# Patient Record
Sex: FEMALE | HISPANIC OR LATINO | URBAN - METROPOLITAN AREA
[De-identification: names, ages, dates, MRNs, and addresses within clinical notes are randomized per-mention and may not be internally consistent; named-entity substitution may affect disease eponyms.]

---

## 2021-10-11 ENCOUNTER — OFFICE VISIT (OUTPATIENT)
Dept: URBAN - METROPOLITAN AREA CLINIC 121 | Facility: CLINIC | Age: 61
End: 2021-10-11

## 2022-05-17 ENCOUNTER — OFFICE VISIT (OUTPATIENT)
Dept: URBAN - METROPOLITAN AREA CLINIC 63 | Facility: CLINIC | Age: 62
End: 2022-05-17

## 2022-05-18 ENCOUNTER — OFFICE VISIT (OUTPATIENT)
Dept: URBAN - METROPOLITAN AREA CLINIC 63 | Facility: CLINIC | Age: 62
End: 2022-05-18

## 2022-06-21 ENCOUNTER — OFFICE VISIT (OUTPATIENT)
Dept: URBAN - METROPOLITAN AREA CLINIC 63 | Facility: CLINIC | Age: 62
End: 2022-06-21

## 2022-07-09 ENCOUNTER — TELEPHONE ENCOUNTER (OUTPATIENT)
Dept: URBAN - METROPOLITAN AREA CLINIC 121 | Facility: CLINIC | Age: 62
End: 2022-07-09

## 2022-07-09 RX ORDER — DICLOFENAC SODIUM TOPICAL GEL, 1%, 10 MG/G
GEL TOPICAL
Refills: 0 | OUTPATIENT
Start: 2022-05-16 | End: 2022-06-21

## 2022-07-09 RX ORDER — OLOPATADINE HYDROCHLORIDE 1 MG/ML
SOLUTION/ DROPS OPHTHALMIC
Refills: 0 | OUTPATIENT
Start: 2022-05-16 | End: 2022-06-21

## 2022-07-09 RX ORDER — ALBUTEROL SULFATE 90 UG/1
AEROSOL, METERED RESPIRATORY (INHALATION)
Refills: 0 | OUTPATIENT
Start: 2022-05-16 | End: 2022-06-21

## 2022-07-09 RX ORDER — ATORVASTATIN CALCIUM 20 MG/1
TABLET, FILM COATED ORAL
Refills: 0 | OUTPATIENT
Start: 2022-05-16 | End: 2022-06-21

## 2022-07-09 RX ORDER — METFORMIN HYDROCHLORIDE 850 MG/1
TABLET ORAL
Refills: 0 | OUTPATIENT
Start: 2022-05-16 | End: 2022-06-21

## 2022-07-09 RX ORDER — LISINOPRIL AND HYDROCHLOROTHIAZIDE TABLETS 10; 12.5 MG/1; MG/1
TABLET ORAL
Refills: 0 | OUTPATIENT
Start: 2022-05-16 | End: 2022-06-21

## 2022-07-10 ENCOUNTER — TELEPHONE ENCOUNTER (OUTPATIENT)
Dept: URBAN - METROPOLITAN AREA CLINIC 121 | Facility: CLINIC | Age: 62
End: 2022-07-10

## 2022-07-10 RX ORDER — FAMOTIDINE 20 MG/1
1 EVERY BEDTIME TABLET ORAL
Refills: 2 | Status: ACTIVE | COMMUNITY
Start: 2022-05-18

## 2022-07-10 RX ORDER — METFORMIN HYDROCHLORIDE 850 MG/1
TABLET ORAL
Refills: 0 | Status: ACTIVE | COMMUNITY
Start: 2022-06-21

## 2022-07-10 RX ORDER — ALBUTEROL SULFATE 90 UG/1
AEROSOL, METERED RESPIRATORY (INHALATION)
Refills: 0 | Status: ACTIVE | COMMUNITY
Start: 2022-06-21

## 2022-07-10 RX ORDER — LISINOPRIL AND HYDROCHLOROTHIAZIDE TABLETS 10; 12.5 MG/1; MG/1
TABLET ORAL
Refills: 0 | Status: ACTIVE | COMMUNITY
Start: 2022-06-21

## 2022-07-10 RX ORDER — LINACLOTIDE 72 UG/1
ONCE A DAY TAKE ONE CAPSULE PO ONCE DAILY 30 MINS BEFORE BREAKFAST CAPSULE, GELATIN COATED ORAL ONCE A DAY
Refills: 0 | Status: ACTIVE | COMMUNITY
Start: 2022-06-21

## 2022-07-10 RX ORDER — OLOPATADINE HYDROCHLORIDE 1 MG/ML
SOLUTION/ DROPS OPHTHALMIC
Refills: 0 | Status: ACTIVE | COMMUNITY
Start: 2022-06-21

## 2022-07-10 RX ORDER — ATORVASTATIN CALCIUM 20 MG/1
TABLET, FILM COATED ORAL
Refills: 0 | Status: ACTIVE | COMMUNITY
Start: 2022-06-21

## 2022-07-10 RX ORDER — FAMOTIDINE 20 MG/1
1 EVERY BEDTIME TABLET ORAL
Refills: 2 | Status: ACTIVE | COMMUNITY
Start: 2022-06-21

## 2022-07-10 RX ORDER — DICLOFENAC SODIUM TOPICAL GEL, 1%, 10 MG/G
GEL TOPICAL
Refills: 0 | Status: ACTIVE | COMMUNITY
Start: 2022-06-21

## 2022-07-22 ENCOUNTER — TELEPHONE ENCOUNTER (OUTPATIENT)
Dept: URBAN - METROPOLITAN AREA CLINIC 63 | Facility: CLINIC | Age: 62
End: 2022-07-22

## 2022-09-20 ENCOUNTER — OFFICE VISIT (OUTPATIENT)
Dept: URBAN - METROPOLITAN AREA CLINIC 63 | Facility: CLINIC | Age: 62
End: 2022-09-20

## 2022-09-20 RX ORDER — METFORMIN HYDROCHLORIDE 850 MG/1
TABLET ORAL
Refills: 0 | Status: ACTIVE | COMMUNITY
Start: 2022-06-21

## 2022-09-20 RX ORDER — LINACLOTIDE 72 UG/1
ONCE A DAY TAKE ONE CAPSULE PO ONCE DAILY 30 MINS BEFORE BREAKFAST CAPSULE, GELATIN COATED ORAL ONCE A DAY
Refills: 0 | Status: ACTIVE | COMMUNITY
Start: 2022-06-21

## 2022-09-20 RX ORDER — OLOPATADINE HYDROCHLORIDE 1 MG/ML
SOLUTION/ DROPS OPHTHALMIC
Refills: 0 | Status: ACTIVE | COMMUNITY
Start: 2022-06-21

## 2022-09-20 RX ORDER — LISINOPRIL AND HYDROCHLOROTHIAZIDE TABLETS 10; 12.5 MG/1; MG/1
TABLET ORAL
Refills: 0 | Status: ACTIVE | COMMUNITY
Start: 2022-06-21

## 2022-09-20 RX ORDER — ALBUTEROL SULFATE 90 UG/1
AEROSOL, METERED RESPIRATORY (INHALATION)
Refills: 0 | Status: ACTIVE | COMMUNITY
Start: 2022-06-21

## 2022-09-20 RX ORDER — DICLOFENAC SODIUM TOPICAL GEL, 1%, 10 MG/G
GEL TOPICAL
Refills: 0 | Status: ACTIVE | COMMUNITY
Start: 2022-06-21

## 2022-09-20 RX ORDER — FAMOTIDINE 20 MG/1
1 EVERY BEDTIME TABLET ORAL
Refills: 2 | Status: ACTIVE | COMMUNITY
Start: 2022-06-21

## 2022-09-20 RX ORDER — ATORVASTATIN CALCIUM 20 MG/1
TABLET, FILM COATED ORAL
Refills: 0 | Status: ACTIVE | COMMUNITY
Start: 2022-06-21

## 2022-09-20 NOTE — HPI-TODAY'S VISIT:
Liane is a pleasant 61-year-old female who presents today for follow up. Patient notes severe reaction to anesthesia. She states she was "paralyzed" and had anaphylaxis during dental work in the past. She states the only anesthesia she can use is mepivacaine 3% and wears a medical bracelet    Labs dated 2/28/2022 showed a normal hemoglobin.  MCV 78.  Normal platelets.  Normal CMP.    EGD July 2017 revealed hiatal hernia in the cardia.  Congestion and erythema in the antrum compatible with gastritis.  Normal mucosa in the duodenum.  Pathology revealed gastric mucosa with reactive gastritis/reactive gastropathy and erosion.  Negative for H. pylori or intestinal metaplasia.  No evidence of dysplasia.  Small bowel biopsies revealed duodenal mucosa with preserved villous architecture.  Negative for inflammation or sprue.  No increase in intraepithelial T lymphocytes    Colonoscopy in October 2018 demonstrated a 3 mm polyp removed from the hepatic flexure.  Diverticulosis throughout the entire colon.  Internal hemorrhoids.  Consider hemorrhoidal banding.  Pathology consistent with a tubular adenoma negative for high-grade dysplasia    Notes on average a daily bowel movement. Noting efficacy with high fiber diet. However if she forgets to consume high quantity of fiber she can go 2-3 days without a BM. This is chronic for her. Patient inquiring about medication to keep her regular. Notes reflux at night and did not  famotidine sent last visit. Labs ordered last visit not done. Denies any nausea, vomiting, dysphagia, odynophagia, hematemesis, coffee ground emesis, abdominal pain, change in bowel habits, abnormal weight loss, BRBPR or melena. Denies any family history of colon cancer or colon polyps. Notes no other GI complaints at this time

## 2022-10-13 ENCOUNTER — TELEPHONE ENCOUNTER (OUTPATIENT)
Dept: URBAN - METROPOLITAN AREA CLINIC 63 | Facility: CLINIC | Age: 62
End: 2022-10-13

## 2022-11-14 ENCOUNTER — LAB OUTSIDE AN ENCOUNTER (OUTPATIENT)
Dept: URBAN - METROPOLITAN AREA CLINIC 63 | Facility: CLINIC | Age: 62
End: 2022-11-14

## 2022-11-15 LAB
ABSOLUTE BASOPHILS: 38
ABSOLUTE EOSINOPHILS: 108
ABSOLUTE LYMPHOCYTES: 2041
ABSOLUTE MONOCYTES: 356
ABSOLUTE NEUTROPHILS: 2857
BASOPHILS: 0.7
EOSINOPHILS: 2
FERRITIN, SERUM: 202
FOLATE (FOLIC ACID), SERUM: 10.3
HEMATOCRIT: 40.4
HEMOGLOBIN: 12.8
IRON BIND.CAP.(TIBC): 340
IRON SATURATION: 22
IRON: 74
LYMPHOCYTES: 37.8
MCH: 25
MCHC: 31.7
MCV: 79.1
MONOCYTES: 6.6
MPV: 10.8
NEUTROPHILS: 52.9
PLATELET COUNT: 265
RDW: 13.6
RED BLOOD CELL COUNT: 5.11
VITAMIN B12: >2000
WHITE BLOOD CELL COUNT: 5.4

## 2022-11-22 ENCOUNTER — OFFICE VISIT (OUTPATIENT)
Dept: URBAN - METROPOLITAN AREA CLINIC 63 | Facility: CLINIC | Age: 62
End: 2022-11-22

## 2022-12-06 ENCOUNTER — WEB ENCOUNTER (OUTPATIENT)
Dept: URBAN - METROPOLITAN AREA CLINIC 63 | Facility: CLINIC | Age: 62
End: 2022-12-06

## 2022-12-06 ENCOUNTER — OFFICE VISIT (OUTPATIENT)
Dept: URBAN - METROPOLITAN AREA CLINIC 63 | Facility: CLINIC | Age: 62
End: 2022-12-06
Payer: COMMERCIAL

## 2022-12-06 VITALS
HEART RATE: 81 BPM | OXYGEN SATURATION: 95 % | WEIGHT: 157.4 LBS | HEIGHT: 62 IN | SYSTOLIC BLOOD PRESSURE: 120 MMHG | BODY MASS INDEX: 28.97 KG/M2 | DIASTOLIC BLOOD PRESSURE: 80 MMHG | TEMPERATURE: 97.2 F

## 2022-12-06 DIAGNOSIS — K59.00 CONSTIPATION, UNSPECIFIED: ICD-10-CM

## 2022-12-06 DIAGNOSIS — K21.9 GERD WITHOUT ESOPHAGITIS: ICD-10-CM

## 2022-12-06 PROCEDURE — 99213 OFFICE O/P EST LOW 20 MIN: CPT | Performed by: PHYSICIAN ASSISTANT

## 2022-12-06 RX ORDER — LINACLOTIDE 72 UG/1
1 CAPSULE AT LEAST 30 MINUTES BEFORE THE FIRST MEAL OF THE DAY ON AN EMPTY STOMACH CAPSULE, GELATIN COATED ORAL ONCE A DAY
Qty: 90 CAPSULE | Refills: 3 | OUTPATIENT

## 2022-12-06 RX ORDER — ATORVASTATIN CALCIUM 20 MG/1
TABLET, FILM COATED ORAL
Refills: 0 | Status: ACTIVE | COMMUNITY
Start: 2022-06-21

## 2022-12-06 RX ORDER — LINACLOTIDE 72 UG/1
ONCE A DAY TAKE ONE CAPSULE PO ONCE DAILY 30 MINS BEFORE BREAKFAST CAPSULE, GELATIN COATED ORAL ONCE A DAY
Refills: 0 | Status: ACTIVE | COMMUNITY
Start: 2022-06-21

## 2022-12-06 RX ORDER — METFORMIN HYDROCHLORIDE 850 MG/1
TABLET ORAL
Refills: 0 | Status: ACTIVE | COMMUNITY
Start: 2022-06-21

## 2022-12-06 RX ORDER — LISINOPRIL AND HYDROCHLOROTHIAZIDE TABLETS 10; 12.5 MG/1; MG/1
TABLET ORAL
Refills: 0 | Status: ACTIVE | COMMUNITY
Start: 2022-06-21

## 2022-12-06 RX ORDER — DICLOFENAC SODIUM TOPICAL GEL, 1%, 10 MG/G
GEL TOPICAL
Refills: 0 | Status: ACTIVE | COMMUNITY
Start: 2022-06-21

## 2022-12-06 RX ORDER — FAMOTIDINE 20 MG/1
1 TABLET AT BEDTIME AS NEEDED TABLET, FILM COATED ORAL ONCE A DAY
Qty: 90 TABLET | Refills: 3 | OUTPATIENT

## 2022-12-06 NOTE — HPI-TODAY'S VISIT:
Liane is a pleasant 62-year-old female who presents today for follow up. Patient notes severe reaction to anesthesia. She states she was "paralyzed" and had anaphylaxis during dental work in the past. She states the only anesthesia she can use is mepivacaine 3% and wears a medical bracelet   [facility] genetic testing dated 6/21/2022 showed no clinically significant mutation identified.  Breast cancer risk score remaining lifetime risk 2.7%.  No variants of uncertain significance identified.  Labs dated 11/14/2022 showed a normal hemoglobin.  Platelets normal.  RBC 5.11, MCV 79.1.  Ferritin normal.  Iron studies normal.  Vitamin B12/folate unremarkable. Labs dated 2/28/2022 showed a normal CMP.    EGD July 2017 revealed hiatal hernia in the cardia.  Congestion and erythema in the antrum compatible with gastritis.  Normal mucosa in the duodenum.  Pathology revealed gastric mucosa with reactive gastritis/reactive gastropathy and erosion.  Negative for H. pylori or intestinal metaplasia.  No evidence of dysplasia.  Small bowel biopsies revealed duodenal mucosa with preserved villous architecture.  Negative for inflammation or sprue.  No increase in intraepithelial T lymphocytes    Colonoscopy in October 2018 demonstrated a 3 mm polyp removed from the hepatic flexure.  Diverticulosis throughout the entire colon.  Internal hemorrhoids.  Consider hemorrhoidal banding.  Pathology consistent with a tubular adenoma negative for high-grade dysplasia    Notes on average a daily bowel movement. Noting efficacy with high fiber diet and Linzess 72 mcg. Patient given a trial of Linzess 72 mcg once daily last visit but failed to report efficacy and thus has run out of medication. Chronic reflux well controlled on famotidine 20 mg qhs. Denies any nausea, vomiting, dysphagia, odynophagia, hematemesis, coffee ground emesis, abdominal pain, change in bowel habits, abnormal weight loss, BRBPR or melena. Denies any family history of colon cancer or colon polyps. Notes no other GI complaints at this time

## 2022-12-12 ENCOUNTER — TELEPHONE ENCOUNTER (OUTPATIENT)
Dept: URBAN - METROPOLITAN AREA CLINIC 63 | Facility: CLINIC | Age: 62
End: 2022-12-12

## 2023-01-11 ENCOUNTER — TELEPHONE ENCOUNTER (OUTPATIENT)
Dept: URBAN - METROPOLITAN AREA CLINIC 63 | Facility: CLINIC | Age: 63
End: 2023-01-11

## 2023-02-10 ENCOUNTER — P2P PATIENT RECORD (OUTPATIENT)
Age: 63
End: 2023-02-10

## 2023-06-14 ENCOUNTER — OFFICE VISIT (OUTPATIENT)
Dept: URBAN - METROPOLITAN AREA CLINIC 63 | Facility: CLINIC | Age: 63
End: 2023-06-14

## 2023-06-14 RX ORDER — METFORMIN HYDROCHLORIDE 850 MG/1
TABLET ORAL
Refills: 0 | COMMUNITY
Start: 2022-06-21

## 2023-06-14 RX ORDER — ATORVASTATIN CALCIUM 20 MG/1
TABLET, FILM COATED ORAL
Refills: 0 | COMMUNITY
Start: 2022-06-21

## 2023-06-14 RX ORDER — DICLOFENAC SODIUM TOPICAL GEL, 1%, 10 MG/G
GEL TOPICAL
Refills: 0 | COMMUNITY
Start: 2022-06-21

## 2023-06-14 RX ORDER — FAMOTIDINE 20 MG/1
1 TABLET AT BEDTIME AS NEEDED TABLET, FILM COATED ORAL ONCE A DAY
Qty: 90 TABLET | Refills: 3 | COMMUNITY

## 2023-06-14 RX ORDER — LINACLOTIDE 72 UG/1
1 CAPSULE AT LEAST 30 MINUTES BEFORE THE FIRST MEAL OF THE DAY ON AN EMPTY STOMACH CAPSULE, GELATIN COATED ORAL ONCE A DAY
Qty: 90 CAPSULE | Refills: 3 | COMMUNITY

## 2023-06-14 RX ORDER — LISINOPRIL AND HYDROCHLOROTHIAZIDE TABLETS 10; 12.5 MG/1; MG/1
TABLET ORAL
Refills: 0 | COMMUNITY
Start: 2022-06-21

## 2023-07-05 ENCOUNTER — WEB ENCOUNTER (OUTPATIENT)
Dept: URBAN - METROPOLITAN AREA CLINIC 63 | Facility: CLINIC | Age: 63
End: 2023-07-05

## 2023-07-05 ENCOUNTER — LAB OUTSIDE AN ENCOUNTER (OUTPATIENT)
Dept: URBAN - METROPOLITAN AREA CLINIC 63 | Facility: CLINIC | Age: 63
End: 2023-07-05

## 2023-07-05 ENCOUNTER — OFFICE VISIT (OUTPATIENT)
Dept: URBAN - METROPOLITAN AREA CLINIC 63 | Facility: CLINIC | Age: 63
End: 2023-07-05
Payer: COMMERCIAL

## 2023-07-05 VITALS
DIASTOLIC BLOOD PRESSURE: 82 MMHG | OXYGEN SATURATION: 98 % | WEIGHT: 154.8 LBS | HEART RATE: 91 BPM | BODY MASS INDEX: 28.49 KG/M2 | TEMPERATURE: 96.9 F | SYSTOLIC BLOOD PRESSURE: 122 MMHG | HEIGHT: 62 IN

## 2023-07-05 DIAGNOSIS — K59.09 CHRONIC CONSTIPATION: ICD-10-CM

## 2023-07-05 DIAGNOSIS — Z12.11 ENCOUNTER FOR SCREENING FOR MALIGNANT NEOPLASM OF COLON: ICD-10-CM

## 2023-07-05 DIAGNOSIS — K64.8 OTHER HEMORRHOIDS: ICD-10-CM

## 2023-07-05 DIAGNOSIS — Z86.010 PERSONAL HISTORY OF COLONIC POLYPS: ICD-10-CM

## 2023-07-05 DIAGNOSIS — K21.9 GERD WITHOUT ESOPHAGITIS: ICD-10-CM

## 2023-07-05 PROBLEM — 428283002: Status: ACTIVE | Noted: 2023-07-05

## 2023-07-05 PROCEDURE — 99214 OFFICE O/P EST MOD 30 MIN: CPT | Performed by: PHYSICIAN ASSISTANT

## 2023-07-05 RX ORDER — LIDOCAINE 50 MG/G
1 APPLICATION AS NEEDED CREAM TOPICAL
Qty: 1 TUBE | Refills: 1 | OUTPATIENT
Start: 2023-07-05 | End: 2023-09-03

## 2023-07-05 RX ORDER — METFORMIN HYDROCHLORIDE 850 MG/1
TABLET ORAL
Refills: 0 | Status: ACTIVE | COMMUNITY
Start: 2022-06-21

## 2023-07-05 RX ORDER — POLYETHYLENE GLYCOL 3350, SODIUM CHLORIDE, SODIUM BICARBONATE, POTASSIUM CHLORIDE 420; 11.2; 5.72; 1.48 G/4L; G/4L; G/4L; G/4L
AS DIRECTED POWDER, FOR SOLUTION ORAL ONCE
Qty: 4000 | Refills: 0 | OUTPATIENT
Start: 2023-07-05 | End: 2023-07-06

## 2023-07-05 RX ORDER — LISINOPRIL AND HYDROCHLOROTHIAZIDE TABLETS 10; 12.5 MG/1; MG/1
TABLET ORAL
Refills: 0 | Status: ACTIVE | COMMUNITY
Start: 2022-06-21

## 2023-07-05 RX ORDER — PLECANATIDE 3 MG/1
1 TABLET TABLET ORAL ONCE A DAY
Qty: 30 | Refills: 1 | OUTPATIENT
Start: 2023-07-05 | End: 2023-09-03

## 2023-07-05 RX ORDER — DICLOFENAC SODIUM TOPICAL GEL, 1%, 10 MG/G
GEL TOPICAL
Refills: 0 | Status: ACTIVE | COMMUNITY
Start: 2022-06-21

## 2023-07-05 RX ORDER — ONDANSETRON HYDROCHLORIDE 4 MG/1
1 TABLET TABLET, FILM COATED ORAL
Qty: 2 | Refills: 0 | OUTPATIENT
Start: 2023-07-05

## 2023-07-05 RX ORDER — FAMOTIDINE 20 MG/1
1 TABLET AT BEDTIME AS NEEDED TABLET, FILM COATED ORAL ONCE A DAY
Qty: 90 TABLET | Refills: 3 | Status: ACTIVE | COMMUNITY

## 2023-07-05 NOTE — HPI-TODAY'S VISIT:
Liane is a pleasant 62-year-old female who presents today for a follow up. Patient notes severe reaction to anesthesia. She states she was "paralyzed" and had anaphylaxis during dental work in the past. She states the only anesthesia she can use is mepivacaine 3% and wears a medical bracelet   Labs dated 2/27/2023 showed a normal hemoglobin.  Normal platelets.  Normal renal function.  Normal LFTs.  [employer] genetic testing dated 6/21/2022 showed no clinically significant mutation identified.  Breast cancer risk score remaining lifetime risk 2.7%.  No variants of uncertain significance identified.   EGD July 2017 revealed hiatal hernia in the cardia.  Congestion and erythema in the antrum compatible with gastritis.  Normal mucosa in the duodenum.  Pathology revealed gastric mucosa with reactive gastritis/reactive gastropathy and erosion.  Negative for H. pylori or intestinal metaplasia.  No evidence of dysplasia.  Small bowel biopsies revealed duodenal mucosa with preserved villous architecture.  Negative for inflammation or sprue.  No increase in intraepithelial T lymphocytes    Colonoscopy in October 2018 demonstrated a 3 mm polyp removed from the hepatic flexure.  Diverticulosis throughout the entire colon.  Internal hemorrhoids.  Consider hemorrhoidal banding.  Pathology consistent with a tubular adenoma negative for high-grade dysplasia    Was noting on average a daily bowel movement with high fiber diet and Linzess 72 mcg. Insurance will not cover Linzess. Now noting issues with constipation, again. Chronic reflux well controlled on famotidine 20 mg qhs. Denies any nausea, vomiting, dysphagia, odynophagia, hematemesis, coffee ground emesis, abdominal pain,  abnormal weight loss, BRBPR or melena. Denies any family history of colon cancer or colon polyps. Notes no other GI complaints at this time

## 2023-07-12 ENCOUNTER — LAB OUTSIDE AN ENCOUNTER (OUTPATIENT)
Dept: URBAN - METROPOLITAN AREA CLINIC 63 | Facility: CLINIC | Age: 63
End: 2023-07-12

## 2023-08-02 ENCOUNTER — TELEPHONE ENCOUNTER (OUTPATIENT)
Dept: URBAN - METROPOLITAN AREA CLINIC 63 | Facility: CLINIC | Age: 63
End: 2023-08-02

## 2023-09-26 ENCOUNTER — TELEPHONE ENCOUNTER (OUTPATIENT)
Dept: URBAN - METROPOLITAN AREA CLINIC 63 | Facility: CLINIC | Age: 63
End: 2023-09-26

## 2023-10-04 ENCOUNTER — OFFICE VISIT (OUTPATIENT)
Dept: URBAN - METROPOLITAN AREA MEDICAL CENTER 14 | Facility: MEDICAL CENTER | Age: 63
End: 2023-10-04

## 2023-10-24 ENCOUNTER — OFFICE VISIT (OUTPATIENT)
Dept: URBAN - METROPOLITAN AREA CLINIC 63 | Facility: CLINIC | Age: 63
End: 2023-10-24

## 2023-12-04 ENCOUNTER — ERX REFILL RESPONSE (OUTPATIENT)
Dept: URBAN - METROPOLITAN AREA CLINIC 63 | Facility: CLINIC | Age: 63
End: 2023-12-04

## 2023-12-04 RX ORDER — FAMOTIDINE 20 MG/1
1 TABLET AT BEDTIME AS NEEDED TABLET, FILM COATED ORAL ONCE A DAY
Qty: 90 TABLET | Refills: 3 | OUTPATIENT

## 2024-01-02 ENCOUNTER — TELEPHONE ENCOUNTER (OUTPATIENT)
Dept: URBAN - METROPOLITAN AREA CLINIC 63 | Facility: CLINIC | Age: 64
End: 2024-01-02

## 2024-01-03 ENCOUNTER — TELEPHONE ENCOUNTER (OUTPATIENT)
Dept: URBAN - METROPOLITAN AREA CLINIC 63 | Facility: CLINIC | Age: 64
End: 2024-01-03

## 2024-01-03 ENCOUNTER — OFFICE VISIT (OUTPATIENT)
Dept: URBAN - METROPOLITAN AREA MEDICAL CENTER 14 | Facility: MEDICAL CENTER | Age: 64
End: 2024-01-03
Payer: COMMERCIAL

## 2024-01-03 DIAGNOSIS — K44.9 HIATAL HERNIA: ICD-10-CM

## 2024-01-03 DIAGNOSIS — Z86.010 COLON POLYP HISTORY: ICD-10-CM

## 2024-01-03 DIAGNOSIS — K21.9 GERD: ICD-10-CM

## 2024-01-03 DIAGNOSIS — K64.0 GRADE I HEMORRHOIDS: ICD-10-CM

## 2024-01-03 DIAGNOSIS — K57.30 COLON, DIVERTICULOSIS: ICD-10-CM

## 2024-01-03 DIAGNOSIS — K29.70 GASTRITIS: ICD-10-CM

## 2024-01-03 PROCEDURE — 45378 DIAGNOSTIC COLONOSCOPY: CPT | Performed by: INTERNAL MEDICINE

## 2024-01-03 PROCEDURE — 43239 EGD BIOPSY SINGLE/MULTIPLE: CPT | Performed by: INTERNAL MEDICINE

## 2024-01-03 RX ORDER — METFORMIN HYDROCHLORIDE 850 MG/1
TABLET ORAL
Refills: 0 | Status: ACTIVE | COMMUNITY
Start: 2022-06-21

## 2024-01-03 RX ORDER — FAMOTIDINE 20 MG/1
1 TABLET AT BEDTIME AS NEEDED TABLET, FILM COATED ORAL ONCE A DAY
Qty: 90 TABLET | Refills: 3 | Status: ACTIVE | COMMUNITY

## 2024-01-03 RX ORDER — DICLOFENAC SODIUM TOPICAL GEL, 1%, 10 MG/G
GEL TOPICAL
Refills: 0 | Status: ACTIVE | COMMUNITY
Start: 2022-06-21

## 2024-01-03 RX ORDER — LISINOPRIL AND HYDROCHLOROTHIAZIDE TABLETS 10; 12.5 MG/1; MG/1
TABLET ORAL
Refills: 0 | Status: ACTIVE | COMMUNITY
Start: 2022-06-21

## 2024-01-03 RX ORDER — ONDANSETRON HYDROCHLORIDE 4 MG/1
1 TABLET TABLET, FILM COATED ORAL
Qty: 2 | Refills: 0 | Status: ACTIVE | COMMUNITY
Start: 2023-07-05

## 2024-01-29 ENCOUNTER — TELEPHONE ENCOUNTER (OUTPATIENT)
Dept: URBAN - METROPOLITAN AREA CLINIC 63 | Facility: CLINIC | Age: 64
End: 2024-01-29

## 2024-02-23 ENCOUNTER — OV EP (OUTPATIENT)
Dept: URBAN - METROPOLITAN AREA CLINIC 63 | Facility: CLINIC | Age: 64
End: 2024-02-23
Payer: COMMERCIAL

## 2024-02-23 VITALS
HEART RATE: 93 BPM | BODY MASS INDEX: 28.71 KG/M2 | OXYGEN SATURATION: 97 % | HEIGHT: 62 IN | SYSTOLIC BLOOD PRESSURE: 110 MMHG | TEMPERATURE: 97.3 F | WEIGHT: 156 LBS | DIASTOLIC BLOOD PRESSURE: 70 MMHG

## 2024-02-23 DIAGNOSIS — K59.09 CHRONIC CONSTIPATION: ICD-10-CM

## 2024-02-23 DIAGNOSIS — Z86.010 PERSONAL HISTORY OF COLONIC POLYPS: ICD-10-CM

## 2024-02-23 DIAGNOSIS — K21.9 GERD WITHOUT ESOPHAGITIS: ICD-10-CM

## 2024-02-23 DIAGNOSIS — K64.8 OTHER HEMORRHOIDS: ICD-10-CM

## 2024-02-23 PROCEDURE — 99213 OFFICE O/P EST LOW 20 MIN: CPT | Performed by: PHYSICIAN ASSISTANT

## 2024-02-23 RX ORDER — METFORMIN HYDROCHLORIDE 850 MG/1
TABLET ORAL
Refills: 0 | Status: ACTIVE | COMMUNITY
Start: 2022-06-21

## 2024-02-23 RX ORDER — EZETIMIBE 10 MG/1
TAKE 1 TABLET BY MOUTH EVERY DAY TABLET ORAL
Qty: 30 EACH | Refills: 3 | Status: ACTIVE | COMMUNITY

## 2024-02-23 RX ORDER — LISINOPRIL AND HYDROCHLOROTHIAZIDE TABLETS 10; 12.5 MG/1; MG/1
TABLET ORAL
Refills: 0 | Status: ACTIVE | COMMUNITY
Start: 2022-06-21

## 2024-02-23 RX ORDER — FAMOTIDINE 20 MG/1
1 TABLET AT BEDTIME AS NEEDED TABLET, FILM COATED ORAL ONCE A DAY
Qty: 90 TABLET | Refills: 3 | Status: ACTIVE | COMMUNITY

## 2024-02-23 NOTE — HPI-TODAY'S VISIT:
Liane is a pleasant 63-year-old female who presents today for a procedure follow up. Patient notes severe reaction to anesthesia. She states she was "paralyzed" and had anaphylaxis during dental work in the past. She states the only anesthesia she can use is mepivacaine 3% and wears a medical bracelet   EGD/colonoscopy dated 1/3/2024 showed small hiatal hernia.  Gastritis.  Moderate internal hemorrhoids.  Repeat colonoscopy in 5 years per protocol.  Gastric mucosa with mild chronic gastritis.  No morphologic evidence of H. pylori.  Squamous epithelium with mild changes suggestive of reflux esophagitis.  Negative for intestinal metaplasia  Labs dated 2/27/2023 showed a normal hemoglobin.  Normal platelets.  Normal renal function.  Normal LFTs.  [employer] genetic testing dated 6/21/2022 showed no clinically significant mutation identified.  Breast cancer risk score remaining lifetime risk 2.7%.  No variants of uncertain significance identified.   No issues after procedure. Noting regular bowel movements with use of Trulance 3 mg qd. Chronic reflux well controlled on famotidine 20 mg qhs. Denies any nausea, vomiting, dysphagia, odynophagia, hematemesis, coffee ground emesis, abdominal pain, change in bowel habits, abnormal weight loss, BRBPR or melena. Denies any family history of colon cancer or colon polyps. Notes no other GI complaints at this time

## 2024-05-23 ENCOUNTER — OFFICE VISIT (OUTPATIENT)
Dept: URBAN - METROPOLITAN AREA CLINIC 66 | Facility: CLINIC | Age: 64
End: 2024-05-23
Payer: SELF-PAY

## 2024-05-23 VITALS
SYSTOLIC BLOOD PRESSURE: 132 MMHG | BODY MASS INDEX: 28.71 KG/M2 | DIASTOLIC BLOOD PRESSURE: 80 MMHG | WEIGHT: 156 LBS | HEIGHT: 62 IN | OXYGEN SATURATION: 98 % | HEART RATE: 89 BPM

## 2024-05-23 DIAGNOSIS — K64.1 GRADE II HEMORRHOIDS: ICD-10-CM

## 2024-05-23 PROBLEM — 721704005: Status: ACTIVE | Noted: 2024-05-23

## 2024-05-23 PROCEDURE — 46221 LIGATION OF HEMORRHOID(S): CPT | Performed by: INTERNAL MEDICINE

## 2024-05-23 PROCEDURE — 99204 OFFICE O/P NEW MOD 45 MIN: CPT | Performed by: INTERNAL MEDICINE

## 2024-05-23 RX ORDER — EZETIMIBE 10 MG/1
TAKE 1 TABLET BY MOUTH EVERY DAY TABLET ORAL
Qty: 30 EACH | Refills: 3 | Status: ACTIVE | COMMUNITY

## 2024-05-23 RX ORDER — FAMOTIDINE 20 MG/1
1 TABLET AT BEDTIME AS NEEDED TABLET, FILM COATED ORAL ONCE A DAY
Qty: 90 TABLET | Refills: 3 | Status: ACTIVE | COMMUNITY

## 2024-05-23 RX ORDER — LISINOPRIL AND HYDROCHLOROTHIAZIDE TABLETS 10; 12.5 MG/1; MG/1
TABLET ORAL
Refills: 0 | Status: ACTIVE | COMMUNITY
Start: 2022-06-21

## 2024-05-23 RX ORDER — HYDROCORTISONE 25 MG/G
1 APPLICATION CREAM TOPICAL TWICE A DAY
Qty: 20 APPLICATOR | Refills: 3 | OUTPATIENT
Start: 2024-05-23 | End: 2024-07-02

## 2024-05-23 RX ORDER — PLECANATIDE 3 MG/1
1 TABLET TABLET ORAL ONCE A DAY
Qty: 30 | Refills: 11 | OUTPATIENT
Start: 2024-05-23 | End: 2025-05-18

## 2024-05-23 RX ORDER — METFORMIN HYDROCHLORIDE 850 MG/1
TABLET ORAL
Refills: 0 | Status: ACTIVE | COMMUNITY
Start: 2022-06-21

## 2024-05-29 ENCOUNTER — TELEPHONE ENCOUNTER (OUTPATIENT)
Dept: URBAN - METROPOLITAN AREA CLINIC 68 | Facility: CLINIC | Age: 64
End: 2024-05-29

## 2024-05-29 RX ORDER — PLECANATIDE 3 MG/1
1 TABLET TABLET ORAL ONCE A DAY
Qty: 30 | Refills: 11
Start: 2024-05-23 | End: 2025-05-25

## 2024-05-31 ENCOUNTER — DASHBOARD ENCOUNTERS (OUTPATIENT)
Age: 64
End: 2024-05-31

## 2024-06-06 ENCOUNTER — OFFICE VISIT (OUTPATIENT)
Dept: URBAN - METROPOLITAN AREA CLINIC 66 | Facility: CLINIC | Age: 64
End: 2024-06-06
Payer: SELF-PAY

## 2024-06-06 VITALS
WEIGHT: 156 LBS | OXYGEN SATURATION: 98 % | SYSTOLIC BLOOD PRESSURE: 126 MMHG | BODY MASS INDEX: 28.71 KG/M2 | HEART RATE: 85 BPM | HEIGHT: 62 IN | DIASTOLIC BLOOD PRESSURE: 82 MMHG

## 2024-06-06 DIAGNOSIS — K64.1 GRADE II HEMORRHOIDS: ICD-10-CM

## 2024-06-06 PROBLEM — 247330004: Status: ACTIVE | Noted: 2024-06-06

## 2024-06-06 PROBLEM — 236069009: Status: ACTIVE | Noted: 2024-06-06

## 2024-06-06 PROCEDURE — 46221 LIGATION OF HEMORRHOID(S): CPT | Performed by: INTERNAL MEDICINE

## 2024-06-06 PROCEDURE — 99213 OFFICE O/P EST LOW 20 MIN: CPT | Performed by: INTERNAL MEDICINE

## 2024-06-06 RX ORDER — EZETIMIBE 10 MG/1
TAKE 1 TABLET BY MOUTH EVERY DAY TABLET ORAL
Qty: 30 EACH | Refills: 3 | Status: ACTIVE | COMMUNITY

## 2024-06-06 RX ORDER — LISINOPRIL AND HYDROCHLOROTHIAZIDE TABLETS 10; 12.5 MG/1; MG/1
TABLET ORAL
Refills: 0 | Status: ACTIVE | COMMUNITY
Start: 2022-06-21

## 2024-06-06 RX ORDER — HYDROCORTISONE ACETATE, PRAMOXINE HCL 2.5; 1 G/100G; G/100G
1 APPLICATION CREAM TOPICAL THREE TIMES A DAY
Status: ACTIVE | COMMUNITY
Start: 2024-06-02

## 2024-06-06 RX ORDER — HYDROCORTISONE 25 MG/G
1 APPLICATION CREAM TOPICAL TWICE A DAY
Qty: 20 APPLICATOR | Refills: 3 | Status: ACTIVE | COMMUNITY
Start: 2024-05-23 | End: 2024-07-02

## 2024-06-06 RX ORDER — METFORMIN HYDROCHLORIDE 850 MG/1
TABLET ORAL
Refills: 0 | Status: ACTIVE | COMMUNITY
Start: 2022-06-21

## 2024-06-06 RX ORDER — FAMOTIDINE 20 MG/1
1 TABLET AT BEDTIME AS NEEDED TABLET, FILM COATED ORAL ONCE A DAY
Qty: 90 TABLET | Refills: 3 | Status: ACTIVE | COMMUNITY

## 2024-06-06 RX ORDER — PLECANATIDE 3 MG/1
1 TABLET TABLET ORAL ONCE A DAY
Qty: 30 | Refills: 11 | Status: ACTIVE | COMMUNITY
Start: 2024-05-23 | End: 2025-05-25

## 2024-06-06 NOTE — HPI-TODAY'S VISIT:
63 y.o. LF with a history of gastritis and hemorrhoids who is  for follow up of  hemorrhoidal bleeding who is here for evaluation. She is s/p an EGD/colonoscopy on 1/2024 with Dr. Payne which showed gastritis, moderate IH. She reports having rectal bleeding since her colonoscopy and is still having intemittent rectal bleeding despite hemorrhoid banding x 1. Today, a second hemorrhoid band was placed in left lateral column. She is currently taking Trulance 3mg/d and reports having improvement in constipation with two bowel movements a day. However, she cannot afford Trulance since she has no medical insurance. She was given information on Dr. Delgado Intestinal formula and Ibgard. She is recommended to try using a Bidet adaptor for her toilet to help with cleansing. She understands to consider surgical evaluation for hemorrhoids if hemorrhoid banding is not successful.  She does have some abdominal cramping and is recommended to try Ibgard.

## 2024-06-20 ENCOUNTER — OFFICE VISIT (OUTPATIENT)
Dept: URBAN - METROPOLITAN AREA CLINIC 66 | Facility: CLINIC | Age: 64
End: 2024-06-20

## 2025-05-27 ENCOUNTER — OFFICE VISIT (OUTPATIENT)
Dept: URBAN - METROPOLITAN AREA CLINIC 63 | Facility: CLINIC | Age: 65
End: 2025-05-27
Payer: COMMERCIAL

## 2025-05-27 ENCOUNTER — LAB OUTSIDE AN ENCOUNTER (OUTPATIENT)
Dept: URBAN - METROPOLITAN AREA CLINIC 63 | Facility: CLINIC | Age: 65
End: 2025-05-27

## 2025-05-27 DIAGNOSIS — K59.04 CHRONIC IDIOPATHIC CONSTIPATION: ICD-10-CM

## 2025-05-27 DIAGNOSIS — K64.8 OTHER HEMORRHOIDS: ICD-10-CM

## 2025-05-27 DIAGNOSIS — K62.5 BRIGHT RED BLOOD PER RECTUM: ICD-10-CM

## 2025-05-27 DIAGNOSIS — R10.84 GENERALIZED ABDOMINAL PAIN: ICD-10-CM

## 2025-05-27 PROBLEM — 74474003: Status: ACTIVE | Noted: 2025-05-27

## 2025-05-27 PROBLEM — 102614006: Status: ACTIVE | Noted: 2025-05-27

## 2025-05-27 PROBLEM — 82934008: Status: ACTIVE | Noted: 2025-05-27

## 2025-05-27 PROCEDURE — 99214 OFFICE O/P EST MOD 30 MIN: CPT

## 2025-05-27 RX ORDER — HYDROCORTISONE ACETATE, PRAMOXINE HCL 2.5; 1 G/100G; G/100G
1 APPLICATION CREAM TOPICAL THREE TIMES A DAY
Status: ACTIVE | COMMUNITY
Start: 2024-06-02

## 2025-05-27 RX ORDER — METFORMIN HYDROCHLORIDE 850 MG/1
TABLET ORAL
Refills: 0 | Status: ACTIVE | COMMUNITY
Start: 2022-06-21

## 2025-05-27 RX ORDER — PLECANATIDE 3 MG/1
1 TABLET TABLET ORAL ONCE A DAY
Qty: 30 | Refills: 3 | OUTPATIENT
Start: 2025-05-27 | End: 2025-09-24

## 2025-05-27 RX ORDER — VIBEGRON 75 MG/1
1 TABLET TABLET, FILM COATED ORAL ONCE A DAY
Status: ACTIVE | COMMUNITY

## 2025-05-27 RX ORDER — LISINOPRIL AND HYDROCHLOROTHIAZIDE TABLETS 10; 12.5 MG/1; MG/1
TABLET ORAL
Refills: 0 | Status: ACTIVE | COMMUNITY
Start: 2022-06-21

## 2025-05-27 RX ORDER — ROSUVASTATIN CALCIUM 5 MG/1
1 TABLET TABLET, COATED ORAL ONCE A DAY
Status: ACTIVE | COMMUNITY

## 2025-05-27 NOTE — HPI-TODAY'S VISIT:
64-year-old female presents the office today for rectal bleeding.  She is [place]-speaking.  This visit was conducted with the use of .  Patient reports that she has been experiencing rectal bleeding for the last 2 weeks.  She notes bright red blood after bowel movement on the toilet paper and in the toilet bowl.  Patient's last colonoscopy was January 2024 with findings of only moderate internal hemorrhoids.  In June 2024 patient had 2 rounds of hemorrhoid banding with Dr. Murdock. Patient states that the hemorrhoid banding was painful and she does not want to complete a third session.  She was previously taking Trulance for her chronic constipation.  Patient reports she had a lapse of medical insurance and was unable to fill her prescription.  She notes that she continues to struggle with her constipation.  She now has medical insurance again we will restart Trulance today. She also reports abdominal pain ongoing for last few weeks.  She describes the pain as what feels like someone is squeezing her intestines.  We had a discussion about possible use of dicyclomine for her abdominal pain.  Will proceed with imaging prior to medications.She denies dysphagia, dyspepsia, pyrosis, change in bowel habits, unintentional weight loss, melena.  Last colonoscopy 1/7/2024  with Dr. Payne - Moderate internal hemorrhoids - Repeat colonoscopy in 5 years for surveillance  Last endoscopy 1/7/2024 with Dr. Payne -Small hiatal hernia - Gastritis

## 2025-05-29 ENCOUNTER — TELEPHONE ENCOUNTER (OUTPATIENT)
Dept: URBAN - METROPOLITAN AREA CLINIC 60 | Facility: CLINIC | Age: 65
End: 2025-05-29

## 2025-05-29 RX ORDER — PLECANATIDE 3 MG/1
1 TABLET TABLET ORAL ONCE A DAY
Qty: 30 | Refills: 3
Start: 2025-05-27 | End: 2025-09-26

## 2025-06-03 ENCOUNTER — TELEPHONE ENCOUNTER (OUTPATIENT)
Dept: URBAN - METROPOLITAN AREA CLINIC 60 | Facility: CLINIC | Age: 65
End: 2025-06-03

## 2025-06-03 RX ORDER — PLECANATIDE 3 MG/1
1 TABLET TABLET ORAL ONCE A DAY
Qty: 30 | Refills: 3
Start: 2025-05-27 | End: 2025-10-01

## 2025-06-05 ENCOUNTER — TELEPHONE ENCOUNTER (OUTPATIENT)
Dept: URBAN - METROPOLITAN AREA CLINIC 60 | Facility: CLINIC | Age: 65
End: 2025-06-05

## 2025-06-05 RX ORDER — PLECANATIDE 3 MG/1
1 TABLET TABLET ORAL ONCE A DAY
Qty: 30 | Refills: 3
Start: 2025-05-27 | End: 2025-10-03

## 2025-06-10 ENCOUNTER — TELEPHONE ENCOUNTER (OUTPATIENT)
Dept: URBAN - METROPOLITAN AREA CLINIC 60 | Facility: CLINIC | Age: 65
End: 2025-06-10

## 2025-06-20 ENCOUNTER — OFFICE VISIT (OUTPATIENT)
Dept: URBAN - METROPOLITAN AREA CLINIC 63 | Facility: CLINIC | Age: 65
End: 2025-06-20
Payer: COMMERCIAL

## 2025-06-20 DIAGNOSIS — K21.9 GERD WITHOUT ESOPHAGITIS: ICD-10-CM

## 2025-06-20 DIAGNOSIS — K64.1 GRADE II HEMORRHOIDS: ICD-10-CM

## 2025-06-20 DIAGNOSIS — K59.09 CHRONIC CONSTIPATION: ICD-10-CM

## 2025-06-20 PROCEDURE — 99213 OFFICE O/P EST LOW 20 MIN: CPT

## 2025-06-20 RX ORDER — VIBEGRON 75 MG/1
1 TABLET TABLET, FILM COATED ORAL ONCE A DAY
Status: ACTIVE | COMMUNITY

## 2025-06-20 RX ORDER — FLUTICASONE PROPIONATE 50 UG/1
PLACE 1 SPRAY INTO EACH NOSTRIL EVERY DAY SPRAY, METERED NASAL
Qty: 16 GRAM | Refills: 0 | Status: ACTIVE | COMMUNITY

## 2025-06-20 RX ORDER — METFORMIN HYDROCHLORIDE 500 MG/1
TABLET, FILM COATED ORAL
Qty: 90 TABLET | Status: ACTIVE | COMMUNITY

## 2025-06-20 RX ORDER — LISINOPRIL AND HYDROCHLOROTHIAZIDE TABLETS 10; 12.5 MG/1; MG/1
TABLET ORAL
Refills: 0 | Status: ACTIVE | COMMUNITY
Start: 2022-06-21

## 2025-06-20 NOTE — HPI-TODAY'S VISIT:
64-year-old female presents the office today for rectal bleeding.  She is [place]-speaking.  This visit was conducted with the use of .  Patient reports that she has been experiencing rectal bleeding for the last 2 weeks.  She notes bright red blood after bowel movement on the toilet paper and in the toilet bowl.  Patient's last colonoscopy was January 2024 with findings of only moderate internal hemorrhoids.  In June 2024 patient had 2 rounds of hemorrhoid banding with Dr. Murdock. Patient states that the hemorrhoid banding was painful, and she does not want to complete a third session.  She was previously taking Trulance for her chronic constipation.  Patient reports she had a lapse of medical insurance and was unable to fill her prescription.  She notes that she continues to struggle with her constipation.  She now has medical insurance again we will restart Trulance today. She also reports abdominal pain ongoing for last few weeks.  She describes the pain as what feels like someone is squeezing her intestines.  We had a discussion about possible use of dicyclomine for her abdominal pain.  Will proceed with imaging prior to medications.She denies dysphagia, dyspepsia, pyrosis, change in bowel habits, unintentional weight loss, melena.  Last colonoscopy 1/7/2024  with Dr. Payne - Moderate internal hemorrhoids - Repeat colonoscopy in 5 years for surveillance  Last endoscopy 1/7/2024 with Dr. Payne -Small hiatal hernia - Gastritis  6/25 Patient MRI demonstrated no acute abnormality liver pancreas spleen normal, diverticulosis without sign of diverticulitis, patient CBC within normal limit, today patient denies any GI symptoms, reported constipation improved since taking oral juice, is helping her with the constipation, Patient is without any abdominal complaints. No dysphagia, heartburn, regurgitation, unintentional weight loss, nausea, vomiting, hematemesis or melena. Bowels are moving regularly without blood per rectum. No complaints of bloating. No jaundice, icterus. Will follow patient in 6 months.

## 2025-07-08 ENCOUNTER — OFFICE VISIT (OUTPATIENT)
Dept: URBAN - METROPOLITAN AREA CLINIC 63 | Facility: CLINIC | Age: 65
End: 2025-07-08